# Patient Record
(demographics unavailable — no encounter records)

---

## 2024-11-14 NOTE — ASSESSMENT
[FreeTextEntry1] : s/p parathyroidectomy hypertrophic scar silicone gel sheets labs today f/u 6 months sooner if needed

## 2024-11-14 NOTE — PHYSICAL EXAM
[de-identified] : well healed scar middle hypertrophic scar with hyperpigmentation [Midline] : located in midline position [Normal] : orientation to person, place, and time: normal

## 2025-03-25 NOTE — HISTORY OF PRESENT ILLNESS
[de-identified] : Pt here s/p parathyroidectomy states silicone gel sheets helped half of scar but still has scar tissue Normal Calcium and PTH level

## 2025-03-25 NOTE — ASSESSMENT
[FreeTextEntry1] : s/p parathyroidectomy Hypertrophic scar Kenalog 10 .2 cc exp 5/27 lot [id number] NDC [id number] continue silicone gel sheets f/u 2 months

## 2025-03-25 NOTE — PHYSICAL EXAM
[de-identified] : well healed scar middle hypertrophic scar with hyperpigmentation on left [Midline] : located in midline position [Normal] : orientation to person, place, and time: normal